# Patient Record
Sex: FEMALE
[De-identification: names, ages, dates, MRNs, and addresses within clinical notes are randomized per-mention and may not be internally consistent; named-entity substitution may affect disease eponyms.]

---

## 2023-04-18 ENCOUNTER — RX ONLY (OUTPATIENT)
Age: 23
Setting detail: RX ONLY
End: 2023-04-18

## 2023-04-18 RX ORDER — CEPHALEXIN 500 MG/1
1 CAPSULE ORAL QID
Qty: 28 | Refills: 0 | Status: ERX | COMMUNITY
Start: 2023-04-18

## 2023-04-18 RX ORDER — MUPIROCIN 20 MG/G
1 OINTMENT TOPICAL BID
Qty: 22 | Refills: 1 | Status: ERX | COMMUNITY
Start: 2023-04-18

## 2023-04-19 ENCOUNTER — APPOINTMENT (RX ONLY)
Dept: URBAN - METROPOLITAN AREA CLINIC 15 | Facility: CLINIC | Age: 23
Setting detail: DERMATOLOGY
End: 2023-04-19

## 2023-04-19 DIAGNOSIS — Z41.9 ENCOUNTER FOR PROCEDURE FOR PURPOSES OTHER THAN REMEDYING HEALTH STATE, UNSPECIFIED: ICD-10-CM

## 2023-04-19 PROCEDURE — ? INVENTORY

## 2023-04-19 PROCEDURE — ? MIRADRY

## 2023-04-19 ASSESSMENT — LOCATION ZONE DERM
LOCATION ZONE: AXILLAE
LOCATION ZONE: AXILLAE

## 2023-04-19 ASSESSMENT — LOCATION SIMPLE DESCRIPTION DERM
LOCATION SIMPLE: LEFT AXILLARY VAULT
LOCATION SIMPLE: LEFT AXILLARY VAULT
LOCATION SIMPLE: RIGHT AXILLARY VAULT

## 2023-04-19 ASSESSMENT — LOCATION DETAILED DESCRIPTION DERM
LOCATION DETAILED: LEFT AXILLARY VAULT
LOCATION DETAILED: RIGHT AXILLARY VAULT
LOCATION DETAILED: LEFT AXILLARY VAULT

## 2023-04-19 NOTE — PROCEDURE: MIRADRY
Treatment Number Location 2: 1
Treatment Energy Level Location 2: 5
Detail Level: Detailed
Anesthesia Volume In Cc: 4[address] [facility]
Comments: The procedure was well tolerated, with no complications. \\nBiotip [id number]
Consent: Written consent obtained, risks reviewed including but not limited to crusting, scabbing, blistering, scarring, darker or lighter pigmentary change, incomplete improvement of hyperhidrosis, wrinkles.
Number Of Placements: [id number]
Number Of Placements Location 2: [address]
Anesthesia Type: 1% lidocaine with epinephrine
Post-Care Instructions: I reviewed with the patient in detail post-care instructions. Patient should avoid sun until area fully healed. \\n\\nI emphasized the importance of cleansing both axillae with an antiseptic Soap (Hibiclens Cleanser) twice a day and to apply antibiotic ointment twice a day for 7 days. Besides that I insisted on the importance of taking the anti- inflammatory NSAID Ibuprofens three to four times a day for 5 to 7 days, and applying the ice pack (wrapped in a towel) as frequent as possible in the first 48 to 96 hours. \\n\\nThe patient will take Cephalexin 500 mg capsule, 1 po QID for 7 days as a prophylaxis.
Template Size Loation 2: 70 x 120
Initial Location: Right and Left Axilla
Template Size: 70 x 140

## 2023-04-19 NOTE — HPI: OTHER
Condition:: hyperhidrosis from her armpits
Please Describe Your Condition:: The patient is here to have her first MiraDry procedure performed on her armpits today. The patient has followed all the Pre-MiraDry Instructions.

## 2023-04-28 ENCOUNTER — APPOINTMENT (RX ONLY)
Dept: URBAN - METROPOLITAN AREA CLINIC 15 | Facility: CLINIC | Age: 23
Setting detail: DERMATOLOGY
End: 2023-04-28

## 2023-04-28 NOTE — HPI: COSMETIC FOLLOW UP
What Condition Are We Treating?: Hyperhidrosis from her armpits
What Procedure Did We Perform At The Last Visit?: The patient had the MiraDry procedure performed 9 days ago and comes today for the first follow-up. The patient states that the swelling is less noticeable now. She experienced some discomfort the first night after the [facility] procedure, but after that she has not experienced pain. She has noticed some lumps in both of her armpits that are not painful, and can perform all the shoulders and arms movements. The patient has not sweated yet.

## 2023-05-05 ENCOUNTER — APPOINTMENT (RX ONLY)
Dept: URBAN - METROPOLITAN AREA CLINIC 15 | Facility: CLINIC | Age: 23
Setting detail: DERMATOLOGY
End: 2023-05-05

## 2023-05-05 NOTE — HPI: COSMETIC FOLLOW UP
What Condition Are We Treating?: Hyperhidrosis on her armpits
What Procedure Did We Perform At The Last Visit?: MiraDry procedure performed 16 days ago. The patient reports that the swelling has subsided completely, and the lumps are smaller than before. She does not feel any pain on her armpits and can perform all the possible movements. She has not come back to the gym yet.

## 2023-07-17 ENCOUNTER — APPOINTMENT (RX ONLY)
Dept: URBAN - METROPOLITAN AREA CLINIC 15 | Facility: CLINIC | Age: 23
Setting detail: DERMATOLOGY
End: 2023-07-17

## 2023-07-17 NOTE — HPI: COSMETIC FOLLOW UP
What Condition Are We Treating?: excessive sweating from her armpits
What Procedure Did We Perform At The Last Visit?: MiraDry procedure performed on last April. The patient is very happy with the results getting up to more than 80% of sweat reduction i both armpits after the first MiraDry procedure. The patient states that the lumps have disappeared and she denies any pain on her armpits. she can perform all the arms' movements without any limitation.

## 2023-10-20 ENCOUNTER — RX ONLY (OUTPATIENT)
Age: 23
Setting detail: RX ONLY
End: 2023-10-20

## 2023-10-20 RX ORDER — MUPIROCIN 20 MG/G
1 OINTMENT TOPICAL BID
Qty: 22 | Refills: 1 | Status: ERX | COMMUNITY
Start: 2023-10-20

## 2023-10-20 RX ORDER — CEPHALEXIN 500 MG/1
1 CAPSULE ORAL QID
Qty: 28 | Refills: 0 | Status: ERX | COMMUNITY
Start: 2023-10-20

## 2023-10-23 ENCOUNTER — APPOINTMENT (RX ONLY)
Dept: URBAN - METROPOLITAN AREA CLINIC 15 | Facility: CLINIC | Age: 23
Setting detail: DERMATOLOGY
End: 2023-10-23

## 2023-10-23 DIAGNOSIS — Z41.9 ENCOUNTER FOR PROCEDURE FOR PURPOSES OTHER THAN REMEDYING HEALTH STATE, UNSPECIFIED: ICD-10-CM

## 2023-10-23 PROCEDURE — ? INVENTORY

## 2023-10-23 PROCEDURE — ? MIRADRY

## 2023-10-23 NOTE — PROCEDURE: MIRADRY
Template Size: 80 x 140
Treatment Number Location 4: 1
Treatment Number Initial Location: 2
Consent: Written consent obtained, risks reviewed including but not limited to crusting, scabbing, blistering, scarring, darker or lighter pigmentary change, incomplete improvement of hyperhidrosis, wrinkles.
Treatment Energy Level Location 2: 5
Post-Care Instructions: I reviewed with the patient in detail post-care instructions. Patient should avoid sun until area fully healed. \\n\\nI emphasized the importance of cleansing both axillae with an antiseptic Soap (Hibiclens Cleanser) twice a day and to apply antibiotic ointment twice a day for 7 days. Besides that I insisted on the importance of taking the anti- inflammatory NSAID Ibuprofens three to four times a day for 5 to 7 days, and applying the ice pack (wrapped in a towel) as frequent as possible in the first 48 to 96 hours. \\n\\nThe patient will take Cephalexin 500 mg capsule, 1 po QID for 7 days as a prophylaxis.
Anesthesia Volume In Cc: 1508 [facility]
Comments: The procedure was well tolerated, with no complications. \\nBiotip [id number]
Number Of Placements Location 2: [address]
Number Of Placements: [address] [facility]
Detail Level: Detailed
Template Size Loation 2: 60 x 140
Initial Location: Right and Left Axilla
Anesthesia Type: 1% lidocaine with epinephrine

## 2023-10-23 NOTE — HPI: OTHER
Condition:: excessive sweating from her armpits
Please Describe Your Condition:: The patient is here to have her second MiraDry procedure performed  on her armpits today. The patient is following all the Pre-MiraDry Instructions.

## 2023-10-30 ENCOUNTER — APPOINTMENT (RX ONLY)
Dept: URBAN - METROPOLITAN AREA CLINIC 15 | Facility: CLINIC | Age: 23
Setting detail: DERMATOLOGY
End: 2023-10-30

## 2023-11-14 ENCOUNTER — APPOINTMENT (RX ONLY)
Dept: URBAN - METROPOLITAN AREA CLINIC 15 | Facility: CLINIC | Age: 23
Setting detail: DERMATOLOGY
End: 2023-11-14

## 2023-11-14 NOTE — HPI: COSMETIC FOLLOW UP
What Condition Are We Treating?: excessive sweating from her armpits.
What Procedure Did We Perform At The Last Visit?: second Miradry Procedure performed 2 months ago. The patient is very happy with the results because she has reduced the amount of sweat considerably.